# Patient Record
Sex: MALE | URBAN - METROPOLITAN AREA
[De-identification: names, ages, dates, MRNs, and addresses within clinical notes are randomized per-mention and may not be internally consistent; named-entity substitution may affect disease eponyms.]

---

## 2021-01-01 ENCOUNTER — OFFICE VISIT (OUTPATIENT)
Dept: URBAN - METROPOLITAN AREA CLINIC 75 | Facility: CLINIC | Age: 71
End: 2021-01-01
Payer: COMMERCIAL

## 2021-01-01 DIAGNOSIS — H10.45 OTHER CHRONIC ALLERGIC CONJUNCTIVITIS: ICD-10-CM

## 2021-01-01 DIAGNOSIS — H25.13 AGE-RELATED NUCLEAR CATARACT, BILATERAL: ICD-10-CM

## 2021-01-01 DIAGNOSIS — H04.123 DRY EYE SYNDROME OF BILATERAL LACRIMAL GLANDS: Primary | ICD-10-CM

## 2021-01-01 PROCEDURE — 99213 OFFICE O/P EST LOW 20 MIN: CPT | Performed by: OPTOMETRIST

## 2021-01-01 PROCEDURE — 99203 OFFICE O/P NEW LOW 30 MIN: CPT | Performed by: OPTOMETRIST

## 2021-01-01 PROCEDURE — 68810 PROBE NASOLACRIMAL DUCT: CPT | Performed by: OPTOMETRIST

## 2021-01-01 RX ORDER — PREDNISOLONE ACETATE 10 MG/ML
1 % SUSPENSION/ DROPS OPHTHALMIC
Qty: 5 | Refills: 0 | Status: ACTIVE
Start: 2021-01-01

## 2021-01-01 RX ORDER — PREDNISOLONE ACETATE 10 MG/ML
1 % SUSPENSION/ DROPS OPHTHALMIC
Qty: 5 | Refills: 0 | Status: INACTIVE
Start: 2021-01-01 | End: 2021-01-01

## 2021-01-01 ASSESSMENT — INTRAOCULAR PRESSURE
OS: 11
OD: 17
OS: 16
OS: 15
OD: 14
OD: 13

## 2021-08-10 NOTE — IMPRESSION/PLAN
Impression: Other chronic allergic conjunctivitis: H10.45- Educated patient on findings. Excessive lacrimal lake. Plan: Will start pt on pred acetate BID for 2 weeks OU. At next visit if no resolution will perform punctual probe and irrigation.

## 2021-08-31 NOTE — IMPRESSION/PLAN
Impression: Other chronic allergic conjunctivitis: H10.45- Educated patient on findings. Excessive lacrimal lake. Plan: Continue on pred acetate once daily OU. At next visit if no resolution will perform punctual probe and irrigation.

## 2021-10-08 NOTE — IMPRESSION/PLAN
Impression: Dry eye syndrome of bilateral lacrimal glands: H04.123- Educated patient on findings. Patient states improvement. Probe & irrigation performed today. Stenosis-OU Negative increase to IOP on steroid drop. Plan: Patient to continue pred acetate every other day til bottle is finished. Will continue to monitor. Patient instructed to call if condition gets worse.